# Patient Record
Sex: MALE | Race: ASIAN
[De-identification: names, ages, dates, MRNs, and addresses within clinical notes are randomized per-mention and may not be internally consistent; named-entity substitution may affect disease eponyms.]

---

## 2019-04-15 PROBLEM — Z00.00 ENCOUNTER FOR PREVENTIVE HEALTH EXAMINATION: Status: ACTIVE | Noted: 2019-04-15

## 2019-04-25 ENCOUNTER — TRANSCRIPTION ENCOUNTER (OUTPATIENT)
Age: 24
End: 2019-04-25

## 2019-04-25 ENCOUNTER — APPOINTMENT (OUTPATIENT)
Age: 24
End: 2019-04-25
Payer: COMMERCIAL

## 2019-04-25 VITALS
DIASTOLIC BLOOD PRESSURE: 70 MMHG | WEIGHT: 116 LBS | SYSTOLIC BLOOD PRESSURE: 103 MMHG | HEIGHT: 69 IN | BODY MASS INDEX: 17.18 KG/M2 | HEART RATE: 79 BPM | TEMPERATURE: 97.8 F

## 2019-04-25 DIAGNOSIS — Z83.1 FAMILY HISTORY OF OTHER INFECTIOUS AND PARASITIC DISEASES: ICD-10-CM

## 2019-04-25 DIAGNOSIS — Z86.19 PERSONAL HISTORY OF OTHER INFECTIOUS AND PARASITIC DISEASES: ICD-10-CM

## 2019-04-25 DIAGNOSIS — Z86.11 PERSONAL HISTORY OF TUBERCULOSIS: ICD-10-CM

## 2019-04-25 PROCEDURE — 99203 OFFICE O/P NEW LOW 30 MIN: CPT | Mod: 25

## 2019-04-25 PROCEDURE — 46600 DIAGNOSTIC ANOSCOPY SPX: CPT

## 2019-04-25 RX ORDER — VALACYCLOVIR 1 G/1
1 TABLET, FILM COATED ORAL
Qty: 30 | Refills: 0 | Status: ACTIVE | COMMUNITY
Start: 2019-03-15

## 2019-04-25 RX ORDER — HYDROCORTISONE 25 MG/G
2.5 CREAM TOPICAL 3 TIMES DAILY
Qty: 1 | Refills: 3 | Status: ACTIVE | COMMUNITY
Start: 2019-04-25 | End: 1900-01-01

## 2019-04-26 NOTE — PHYSICAL EXAM
[FreeTextEntry1] : Gen NAD, AOx3\par \par Anorectal Exam:\par Inspection hirsute, no erythema, induration or fluctuance, no skin excoriation, no fissure\par GAVIN nontender, no masses palpated, no blood on gloved finger\par Anoscopy no fissure, mild to moderate internal hemorrhoids\par \par

## 2019-04-26 NOTE — CONSULT LETTER
[Dear  ___] : Dear  [unfilled], [Consult Letter:] : I had the pleasure of evaluating your patient, [unfilled]. [( Thank you for referring [unfilled] for consultation for _____ )] : Thank you for referring [unfilled] for consultation for [unfilled] [Please see my note below.] : Please see my note below. [Consult Closing:] : Thank you very much for allowing me to participate in the care of this patient.  If you have any questions, please do not hesitate to contact me. [Sincerely,] : Sincerely, [FreeTextEntry3] : ROSSANA GOMEZ MD [FreeTextEntry2] : LARISSA BOLDEN\par [address]\[place] [address]

## 2019-04-26 NOTE — HISTORY OF PRESENT ILLNESS
[FreeTextEntry1] : 24 yo MSM M presents for evaluation of itchy anus and intermittent bleeding, referred by PCP Antonino Black\par \par PMH HSV I with oral lesions only, on daily suppressive therapy\par \par Pt c/o anal itching and occasional drops of blood on TP, last noted 1 week ago\par Reports hx of intermittent symptoms since July 2018, symptoms returned 1-2 weeks ago.\par Pt seen by [facility] Dr. Joseph who prescribed Hydrocortisone-Pramoxine for 3 months, symptoms were mildly improved. Pt returned to office, but did not receive alternate treatment. Presents to office today for second opinion. Has not used any topical treatment recently.\par Reports hx of persistent itching, burning after BM which resolves after an hour\par Wipes with toilet paper especially when he feels itching\par Reports only slight itching at present\par \par BH: 3-4 times a week, occasional straining\par Low intake dietary intake fiber, eats mostly rice, soup, meat, fast food. Drinks 1/2 L daily\par Reports low caffeine intake (approx 1-2 servings per month)\par No use of fiber supplements or stool softeners\par MSM, HIV (-), (+) anal receptive sex\par Completed HPV series\par Never had a colonoscopy\par Denies [facility] colorectal CA

## 2019-04-26 NOTE — ASSESSMENT
[FreeTextEntry1] : Exam findings and diagnosis were discussed at length with patient. \par Recommendations including increased fiber intake, adequate daily hydration, stool softeners as needed, and sitz baths as needed and after bowel movements were discussed.\par Medical management, such as calmol-4 suppositories or hydrocortisone cream/suppositories were discussed.\par Office based treatment, such as rubber band ligation, was discussed as an alternative if symptoms persist despite conservative management.\par Patient has not been on any topical treatment recently. Recommend dietary modifications and trial of conservative measures with close f/u in 2-3weeks for consideration of office treatments if no improvement with these recommendations.\par All questions answered, patient expressed understanding and is agreeable to this plan.\par

## 2019-04-26 NOTE — REVIEW OF SYSTEMS
[Chest Pain] : chest pain [As Noted in HPI] : as noted in HPI [Anxiety] : anxiety [Negative] : Heme/Lymph [FreeTextEntry3] : change in vision [de-identified] : stress [FreeTextEntry4] : dizziness [FreeTextEntry8] : genital sores [FreeTextEntry1] : rash

## 2019-07-11 ENCOUNTER — APPOINTMENT (OUTPATIENT)
Dept: COLORECTAL SURGERY | Facility: CLINIC | Age: 24
End: 2019-07-11
Payer: COMMERCIAL

## 2019-07-11 VITALS
BODY MASS INDEX: 17.03 KG/M2 | HEART RATE: 75 BPM | DIASTOLIC BLOOD PRESSURE: 77 MMHG | WEIGHT: 115 LBS | SYSTOLIC BLOOD PRESSURE: 135 MMHG | HEIGHT: 69 IN | TEMPERATURE: 97.2 F

## 2019-07-11 DIAGNOSIS — K64.8 OTHER HEMORRHOIDS: ICD-10-CM

## 2019-07-11 PROCEDURE — 99212 OFFICE O/P EST SF 10 MIN: CPT | Mod: 25

## 2019-07-11 PROCEDURE — 46221 LIGATION OF HEMORRHOID(S): CPT

## 2019-07-12 NOTE — CONSULT LETTER
[Consult Letter:] : I had the pleasure of evaluating your patient, [unfilled]. [( Thank you for referring [unfilled] for consultation for _____ )] : Thank you for referring [unfilled] for consultation for [unfilled] [Dear  ___] : Dear  [unfilled], [Consult Closing:] : Thank you very much for allowing me to participate in the care of this patient.  If you have any questions, please do not hesitate to contact me. [Please see my note below.] : Please see my note below. [FreeTextEntry2] : LARISSA BOLDEN\par [address]\par [place], [place] [FreeTextEntry3] : ROSSANA GOMEZ MD [Sincerely,] : Sincerely,

## 2019-07-12 NOTE — PROCEDURE
[FreeTextEntry1] : Rubber Band Ligation\par \par Pre-procedure Diagnosis: Symptomatic Internal Hemorrhoids\par Post-procedure Diagnosis: Symptomatic Internal Hemorrhoids\par Procedure: Anoscopy with Rubber Band Ligation\par Anesthesia: none\par Estimated blood loss: minimal\par Specimen: none\par Drain: none\par Complications: none\par \par Procedure Details: Consent obtained. Patient was placed in a modified prone priyank-knife position on the examination table. Digital rectal exam was performed with an index finger with surgical jelly lubricant. An anoscope was inserted into the anal canal, and a prominent hemorrhoidal bundle was identified in the right posterior position. Using the hemorrhoid ligation device, a rubber band was placed around this hemorrhoid. No active bleeding was noted. The anoscope was removed. The patient tolerated the procedure well.\par \par \par

## 2019-07-12 NOTE — ASSESSMENT
[FreeTextEntry1] : Symptoms improving but itchiness persists despite medical management\par Recommend trial of rubber band ligation, right posterior band placed today\par Post-procedure instructions were reviewed with the patient, including recommendation to notify office immediately for any symptoms of severe pain, bleeding, inability to urinate, fever, or any other concern. \par F/u in 3 weeks or sooner as needed for reevaluation if symptoms worsen or persist\par All questions were answered, patient expressed understanding, and is agreeable to this plan.\par

## 2019-07-12 NOTE — PHYSICAL EXAM
[FreeTextEntry1] : Gen NAD, AOx3\par \par Anorectal Exam:\par Inspection hirsute, no erythema, induration or fluctuance, no skin excoriation, no fissure\par GAVIN nontender, no masses palpated, no blood on gloved finger\par Anoscopy no fissure, prominent internal hemorrhoidal bundle right posterior\par \par

## 2019-07-12 NOTE — HISTORY OF PRESENT ILLNESS
[FreeTextEntry1] : 22 yo M presents for f/u anal itching and intermittent bleeding\par \par Last seen 4/25/19, noted mild/moderate internal hemorrhoids on exam, recommended medical management. \par Patient has been using hydrocortisone 2.5% BID for 4 weeks w/ resolution in bleeding.\par c/o continued itching, worse during daytime.\par + discomfort w/ wiping w/ TP and feels he may "scrub" too much\par Feels better w/ cleaning w/ warm water \par hx of diarrhea last week for a few days and reports worsening in itching\par \par BH: daily, no straining\par Low intake dietary intake fiber, eats mostly rice, soup, meat, fast food. Drinks 1 L water daily\par Reports low caffeine intake (approx 1-2 servings per month)\par No use of fiber supplements or stool softeners\par MSM, HIV (-), (+) anal receptive sex\par hx of herpes, not taking valacyclovir\par Completed HPV series\par Never had a colonoscopy\par Denies [facility] colorectal CA

## 2021-01-15 ENCOUNTER — TRANSCRIPTION ENCOUNTER (OUTPATIENT)
Age: 26
End: 2021-01-15